# Patient Record
Sex: MALE | Race: WHITE | Employment: UNEMPLOYED | URBAN - METROPOLITAN AREA
[De-identification: names, ages, dates, MRNs, and addresses within clinical notes are randomized per-mention and may not be internally consistent; named-entity substitution may affect disease eponyms.]

---

## 2020-09-21 ENCOUNTER — OFFICE VISIT (OUTPATIENT)
Dept: ORTHOPEDIC SURGERY | Age: 45
End: 2020-09-21

## 2020-09-21 VITALS
SYSTOLIC BLOOD PRESSURE: 127 MMHG | WEIGHT: 142 LBS | DIASTOLIC BLOOD PRESSURE: 90 MMHG | TEMPERATURE: 97 F | HEIGHT: 68 IN | HEART RATE: 78 BPM | BODY MASS INDEX: 21.52 KG/M2

## 2020-09-21 PROCEDURE — 99203 OFFICE O/P NEW LOW 30 MIN: CPT | Performed by: ORTHOPAEDIC SURGERY

## 2020-09-21 RX ORDER — GABAPENTIN 300 MG/1
CAPSULE ORAL
Qty: 90 CAPSULE | Refills: 1 | Status: SHIPPED | OUTPATIENT
Start: 2020-09-21 | End: 2020-09-21 | Stop reason: CLARIF

## 2020-09-21 RX ORDER — GABAPENTIN 300 MG/1
CAPSULE ORAL
Qty: 90 CAPSULE | Refills: 1 | Status: SHIPPED | OUTPATIENT
Start: 2020-09-21 | End: 2022-06-15

## 2020-09-21 ASSESSMENT — ENCOUNTER SYMPTOMS
ABDOMINAL PAIN: 0
DIARRHEA: 0
SHORTNESS OF BREATH: 0
COLOR CHANGE: 0
APNEA: 0
VOMITING: 0
NAUSEA: 0
ABDOMINAL DISTENTION: 0
CONSTIPATION: 0
CHEST TIGHTNESS: 0
COUGH: 0

## 2020-09-21 NOTE — LETTER
[address] [place] [address] and Sports Medicine  [place] [address]  Phone: [phone]  Fax: DO Carlos      September 21, 2020     Patient: Vishal Cabello   YOB: 1975   Date of Visit: 9/21/2020       To Whom It May Concern: It is my medical opinion that Vishal Cabello may return to light duty immediately with the following restrictions: Right arm work while constantly wearing the sling on the left. If you have any questions or concerns, please don't hesitate to call.     Sincerely,        Lazarus Bramble, DO
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
## 2020-10-01 ENCOUNTER — TELEPHONE (OUTPATIENT)
Dept: ORTHOPEDIC SURGERY | Age: 45
End: 2020-10-01

## 2020-10-02 NOTE — TELEPHONE ENCOUNTER
I talked to the patient, I will obtain the records and talk with Dr. Ariadna Diane about what to do next and I will follow up with the patient.

## 2020-10-05 ENCOUNTER — TELEPHONE (OUTPATIENT)
Dept: ORTHOPEDIC SURGERY | Age: 45
End: 2020-10-05

## 2020-10-05 NOTE — TELEPHONE ENCOUNTER
STD Provider needs documentation. We have the faxed paper work from Jm to complete and return. Dr. JASSI GUSMAN [facility] has recommended a MRI of the brachial plexus with contrast.  With speaking with the MRI department she stated with and without contrast.  If you agree, please sign and return either Tamara Amezquita or myself to contact the patient for further instruction.

## 2020-10-06 ENCOUNTER — TELEPHONE (OUTPATIENT)
Dept: ORTHOPEDIC SURGERY | Age: 45
End: 2020-10-06

## 2020-10-06 NOTE — TELEPHONE ENCOUNTER
PT said that Radiology needs the MRI order signed by Dr Lizzy Robison before PT can come do it. PT desires that MRI order be signed please and reissued.

## 2020-10-23 ENCOUNTER — HOSPITAL ENCOUNTER (OUTPATIENT)
Dept: MRI IMAGING | Age: 45
Discharge: HOME | End: 2020-10-25

## 2020-10-23 PROCEDURE — A9579 GAD-BASE MR CONTRAST NOS,1ML: HCPCS | Performed by: ORTHOPAEDIC SURGERY

## 2020-10-23 PROCEDURE — 73220 MRI UPPR EXTREMITY W/O&W/DYE: CPT

## 2020-10-23 PROCEDURE — 6360000004 HC RX CONTRAST MEDICATION: Performed by: ORTHOPAEDIC SURGERY

## 2020-10-23 RX ADMIN — GADOTERIDOL 15 ML: 279.3 INJECTION, SOLUTION INTRAVENOUS at 08:52

## 2022-06-15 ENCOUNTER — OFFICE VISIT (OUTPATIENT)
Dept: PRIMARY CARE CLINIC | Age: 47
End: 2022-06-15
Payer: MEDICAID

## 2022-06-15 VITALS
HEART RATE: 75 BPM | OXYGEN SATURATION: 98 % | WEIGHT: 170 LBS | RESPIRATION RATE: 18 BRPM | BODY MASS INDEX: 25.76 KG/M2 | DIASTOLIC BLOOD PRESSURE: 76 MMHG | SYSTOLIC BLOOD PRESSURE: 124 MMHG | HEIGHT: 68 IN

## 2022-06-15 DIAGNOSIS — R53.82 CHRONIC FATIGUE: ICD-10-CM

## 2022-06-15 DIAGNOSIS — E78.9 LIPID DISORDER: ICD-10-CM

## 2022-06-15 DIAGNOSIS — Z11.4 ENCOUNTER FOR SCREENING FOR HIV: ICD-10-CM

## 2022-06-15 DIAGNOSIS — L50.9 HIVES: ICD-10-CM

## 2022-06-15 DIAGNOSIS — R73.09 IMPAIRED GLUCOSE METABOLISM: ICD-10-CM

## 2022-06-15 DIAGNOSIS — E55.9 VITAMIN D DEFICIENCY: ICD-10-CM

## 2022-06-15 DIAGNOSIS — R42 DIZZINESS: ICD-10-CM

## 2022-06-15 DIAGNOSIS — Z11.59 NEED FOR HEPATITIS C SCREENING TEST: ICD-10-CM

## 2022-06-15 DIAGNOSIS — M54.6 MIDLINE THORACIC BACK PAIN, UNSPECIFIED CHRONICITY: Primary | ICD-10-CM

## 2022-06-15 PROCEDURE — G8427 DOCREV CUR MEDS BY ELIG CLIN: HCPCS | Performed by: STUDENT IN AN ORGANIZED HEALTH CARE EDUCATION/TRAINING PROGRAM

## 2022-06-15 PROCEDURE — 1036F TOBACCO NON-USER: CPT | Performed by: STUDENT IN AN ORGANIZED HEALTH CARE EDUCATION/TRAINING PROGRAM

## 2022-06-15 PROCEDURE — G8419 CALC BMI OUT NRM PARAM NOF/U: HCPCS | Performed by: STUDENT IN AN ORGANIZED HEALTH CARE EDUCATION/TRAINING PROGRAM

## 2022-06-15 PROCEDURE — 99204 OFFICE O/P NEW MOD 45 MIN: CPT | Performed by: STUDENT IN AN ORGANIZED HEALTH CARE EDUCATION/TRAINING PROGRAM

## 2022-06-15 RX ORDER — BLOOD PRESSURE TEST KIT
1 KIT MISCELLANEOUS DAILY
Qty: 1 KIT | Refills: 0 | Status: SHIPPED | OUTPATIENT
Start: 2022-06-15

## 2022-06-15 SDOH — ECONOMIC STABILITY: FOOD INSECURITY: WITHIN THE PAST 12 MONTHS, YOU WORRIED THAT YOUR FOOD WOULD RUN OUT BEFORE YOU GOT MONEY TO BUY MORE.: NEVER TRUE

## 2022-06-15 SDOH — ECONOMIC STABILITY: FOOD INSECURITY: WITHIN THE PAST 12 MONTHS, THE FOOD YOU BOUGHT JUST DIDN'T LAST AND YOU DIDN'T HAVE MONEY TO GET MORE.: NEVER TRUE

## 2022-06-15 ASSESSMENT — ENCOUNTER SYMPTOMS
SHORTNESS OF BREATH: 0
WHEEZING: 0
BACK PAIN: 1
COUGH: 0
RHINORRHEA: 0
EYE ITCHING: 0
EYE DISCHARGE: 0
ABDOMINAL PAIN: 0
ABDOMINAL DISTENTION: 0

## 2022-06-15 ASSESSMENT — PATIENT HEALTH QUESTIONNAIRE - PHQ9
SUM OF ALL RESPONSES TO PHQ QUESTIONS 1-9: 0
SUM OF ALL RESPONSES TO PHQ QUESTIONS 1-9: 0
SUM OF ALL RESPONSES TO PHQ9 QUESTIONS 1 & 2: 0
SUM OF ALL RESPONSES TO PHQ QUESTIONS 1-9: 0
SUM OF ALL RESPONSES TO PHQ QUESTIONS 1-9: 0
1. LITTLE INTEREST OR PLEASURE IN DOING THINGS: 0
2. FEELING DOWN, DEPRESSED OR HOPELESS: 0

## 2022-06-15 ASSESSMENT — SOCIAL DETERMINANTS OF HEALTH (SDOH): HOW HARD IS IT FOR YOU TO PAY FOR THE VERY BASICS LIKE FOOD, HOUSING, MEDICAL CARE, AND HEATING?: NOT VERY HARD

## 2022-06-15 NOTE — PROGRESS NOTES
[facility] PRIMARY CARE  [facility] [address] [facility] [address]  [address] [address]  Dept: [phone]  Dept Fax: [phone]    Glenna Dial is a 42 y.o. male who presents today for his medical conditions/complaints as noted below. Chief Complaint   Patient presents with    New Patient    Referral - General     derm       HPI:     42 y. o. M presented to office to establish care. He had multiple questions and concerns. Mentioned he has upper thoracic back pain since he was a teenager. Initially was to follow-up with physical therapy and chiropractor but stopped due to insurance issues. Requested imaging of upper back. He also has previous history of brachial plexopathy. Used to be on Neurontin for this which he stopped on his own due to side effects. Stable. Mentioned he had couple of episodes of dizziness. He felt he was dehydrated. He feels dizzy while standing. Denied any associated chest pain, palpitations, shortness of breath or pedal edema. No previous cardiac history. He mentioned he gets headaches. Got his eyes checked recently and is waiting to be glasses. Blood pressure normal this visit. No other current complaints. Advised lifestyle changes. Medications reviewed and refilled as appropriate, problem list updated. All concerns discussed in details and all questions answered to patient satisfaction.             No results found for: LABA1C          ( goal A1C is < 7)   No results found for: LABMICR  No results found for: LDLCHOLESTEROL, LDLCALC    (goal LDL is <100)   No results found for: AST, ALT, BUN, CR  BP Readings from Last 3 Encounters:   06/15/22 124/76   20 (!) 127/90   12 118/72          (goal 120/80)    Past Medical History:   Diagnosis Date    Gout       Past Surgical History:   Procedure Laterality Date    SHOULDER SURGERY      VASECTOMY         Family History   Family history unknown: Yes       Social History Tobacco Use    Smoking status: Former Smoker     Packs/day: 1.00     Types: Cigarettes     Quit date: 6/15/2019     Years since quitting: 3.0    Smokeless tobacco: Never Used   Substance Use Topics    Alcohol use: No      Current Outpatient Medications   Medication Sig Dispense Refill    Blood Pressure KIT 1 kit by Does not apply route daily 1 kit 0     No current facility-administered medications for this visit. Allergies   Allergen Reactions    Ultram [Tramadol Hcl] Nausea Only     Dizzy and lightheaded       Health Maintenance   Topic Date Due    COVID-19 Vaccine (1) Never done    A1C test (Diabetic or Prediabetic)  Never done    HIV screen  Never done    Hepatitis C screen  Never done    Lipids  Never done    Flu vaccine (Season Ended) 09/01/2022    Depression Screen  06/15/2023    DTaP/Tdap/Td vaccine (2 - Td or Tdap) 07/18/2030    Hepatitis A vaccine  Aged Out    Hepatitis B vaccine  Aged Out    Hib vaccine  Aged Out    Meningococcal (ACWY) vaccine  Aged Out    Pneumococcal 0-64 years Vaccine  Aged Out       Subjective:      Review of Systems   Constitutional: Positive for fatigue. Negative for chills and fever. HENT: Negative for congestion, hearing loss and rhinorrhea. Eyes: Negative for discharge and itching. Respiratory: Negative for cough, shortness of breath and wheezing. Cardiovascular: Negative for chest pain, palpitations and leg swelling. Gastrointestinal: Negative for abdominal distention and abdominal pain. Endocrine: Negative for polydipsia and polyphagia. Genitourinary: Negative for difficulty urinating and dysuria. Musculoskeletal: Positive for arthralgias and back pain. Upper thoracic back pain   Skin: Negative for rash and wound. Neurological: Positive for headaches. Negative for dizziness, seizures and light-headedness. Psychiatric/Behavioral: Negative for agitation and behavioral problems.        Objective:     Physical Exam  Constitutional:       Appearance: He is well-developed. HENT:      Head: Normocephalic and atraumatic. Eyes:      Conjunctiva/sclera: Conjunctivae normal.      Pupils: Pupils are equal, round, and reactive to light. Neck:      Thyroid: No thyromegaly. Vascular: No JVD. Cardiovascular:      Rate and Rhythm: Normal rate and regular rhythm. Heart sounds: Normal heart sounds. No murmur heard. Pulmonary:      Effort: Pulmonary effort is normal.      Breath sounds: Normal breath sounds. No wheezing. Abdominal:      General: Bowel sounds are normal. There is no distension. Palpations: Abdomen is soft. Tenderness: There is no abdominal tenderness. There is no rebound. Musculoskeletal:         General: No tenderness. Normal range of motion. Cervical back: Normal range of motion and neck supple. Neurological:      Mental Status: He is alert and oriented to person, place, and time. Cranial Nerves: No cranial nerve deficit. Psychiatric:         Behavior: Behavior normal.       /76   Pulse 75   Resp 18   Ht 5' 8\" (1.727 m)   Wt 170 lb (77.1 kg)   SpO2 98%   BMI 25.85 kg/m²     Assessment:        Diagnoses and all orders for this visit:  Midline thoracic back pain, unspecified chronicity  -     XR THORACIC SPINE (2 VIEWS); Future  -     [facility] Physical Therapy - [place]  Chronic fatigue  -     CBC with Auto Differential; Future  -     Comprehensive Metabolic Panel; Future  -     Vitamin B12 & Folate; Future  -     TSH with Reflex; Future  Lipid disorder  -     Lipid Panel; Future  Vitamin D deficiency  -     Vitamin D 25 Hydroxy; Future  Impaired glucose metabolism  -     Hemoglobin A1C; Future  Need for hepatitis C screening test  -     Hepatitis C Antibody  Encounter for screening for HIV  -     HIV Screen;  Future  Dizziness  -     Blood Pressure KIT; 1 kit by Does not apply route daily  -     Compression Helga Kailash Rojo MD, Dermatology, [facility]      She had multiple questions and concerns. Extra time was spent to explain and listen to all the concerns. Answered to patient's satisfaction. Plan:      Return in about 6 months (around 12/15/2022).     Orders Placed This Encounter   Procedures    XR THORACIC SPINE (2 VIEWS)     Standing Status:   Future     Standing Expiration Date:   6/15/2023     Order Specific Question:   Reason for exam:     Answer:   upper thoracic pain    CBC with Auto Differential     Standing Status:   Future     Standing Expiration Date:   6/15/2023    Comprehensive Metabolic Panel     Standing Status:   Future     Standing Expiration Date:   12/15/2023    Hemoglobin A1C     Standing Status:   Future     Standing Expiration Date:   12/15/2023    Lipid Panel     Standing Status:   Future     Standing Expiration Date:   6/15/2023     Order Specific Question:   Is Patient Fasting?/# of Hours     Answer:   8-10 hrs    Vitamin B12 & Folate     Standing Status:   Future     Standing Expiration Date:   6/15/2023    TSH with Reflex     Standing Status:   Future     Standing Expiration Date:   6/15/2023    Vitamin D 25 Hydroxy     Standing Status:   Future     Standing Expiration Date:   12/15/2023    Hepatitis C Antibody    HIV Screen     Standing Status:   Future     Standing Expiration Date:   6/15/2023   Taylor Thomson MD, Dermatology, [facility]     Referral Priority:   Routine     Referral Type:   Eval and Treat     Referral Reason:   Specialty Services Required     Referred to Provider:   Cam Parks MD     Requested Specialty:   Dermatology     Number of Visits Requested:   [facility] Physical Therapy - [place]     Referral Priority:   Routine     Referral Type:   Eval and Treat     Referral Reason:   Specialty Services Required     Requested Specialty:   Physical Therapist     Number of Visits Requested:   1    Compression Stocking     Orders Placed This Encounter   Medications    Blood Pressure KIT     Si kit by Does not apply route daily     Dispense:  1 kit     Refill:  0         Patient given educational materials - see patient instructions. Discussed use, benefit, and side effects of prescribedmedications. All patient questions answered. Pt voiced understanding. Reviewed health maintenance. Instructed to continue current medications, diet and exercise. Patient agreed with treatment plan. Follow up as directed. I spent a total of 45 minutes face to face with this patient. Over 50% of that time was spent on counseling and care coordination. Please see assessment and plan for details. Electronically signed by   Boom Wan MD on 6/15/2022 at 10:16 AM  [facility]. Please note that this chart was generated using voice recognition Dragon dictation software. Although every effort was made to ensure the accuracy of this automatedtranscription, some errors in transcription may have occurred.

## 2022-06-24 ENCOUNTER — HOSPITAL ENCOUNTER (OUTPATIENT)
Dept: PHYSICAL THERAPY | Facility: CLINIC | Age: 47
Setting detail: THERAPIES SERIES
Discharge: HOME | End: 2022-06-24
Payer: MEDICAID

## 2022-06-24 PROCEDURE — 97162 PT EVAL MOD COMPLEX 30 MIN: CPT
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
## 2022-07-13 ENCOUNTER — HOSPITAL ENCOUNTER (OUTPATIENT)
Dept: PHYSICAL THERAPY | Facility: CLINIC | Age: 47
Setting detail: THERAPIES SERIES
Discharge: HOME | End: 2022-07-13
Payer: MEDICAID

## 2022-07-13 PROCEDURE — 97110 THERAPEUTIC EXERCISES: CPT

## 2022-07-13 PROCEDURE — 97016 VASOPNEUMATIC DEVICE THERAPY: CPT

## 2022-07-13 NOTE — FLOWSHEET NOTE
[] [address]  [address]  [phone]  F: [phone] [] [address]  [facility]   [address]  P: [phone]  F: [phone] [x] [place] &  Therapy  [address]  P: [phone]  F: [phone] [] [address]  P: [phone]  F: [phone] [] [address]  [facility]   [address]   [place]: [phone]  F: [phone]      Physical Therapy Daily Treatment Note    Date:  2022  Patient Name:  Jason Lay    :  1975  MRN: [id number]  Physician: Dr. Lai Cea: [facility]- [facility] yr; [place] 75; No patient liability  Medical Diagnosis: Midline thoracic spine pain                   Rehab Codes: M54.6  Onset Date: 20                            Next 's appt.: 22  Visit# / total visits:    Cancels/No Shows: 0/0    Subjective:    Pain:  [x] Yes  [] No Location: L shoulder and mid T spine  Pain Rating: (0-10 scale) /10  Pain altered Tx:  [x] No  [] Yes  Action:  Comments: he will riding a jetski for 1.5 hours in 2 wks. Advised him to wear a kathleen brace. He can do a push up on the floor even though it's not 50/50.       Objective:  Modalities:   Treatment Location  Left      Right                          Position   Shoulder [x]          []  [] Supine    [] Prone   [] Side lying  [x] Sitting          Treatment Modality   2 Vasocompression    34° temp    Med pressure     15min   1 Other:  ex       Precautions: standard  Exercises:  Exercise Reps/ Time Weight/ Level Completed Comments   Supine           Hold swiss ball w/ external perturbations 2x30\"   x     ABCs w/ ball 1x 1.5 lbs x     Sidelying           Ext rot 2x10 1 lb x W/ towel; [email or web address]  Date: 07/13/2022  Prepared by: Shane Brito    Exercises  Sidelying Shoulder External Rotation - 2 x daily - 7 x weekly - 2 sets - 10 reps  Sidelying Shoulder Abduction Palm Forward - 2 x daily - 7 x weekly - 2 sets - 10 reps  Prone Shoulder Extension - Single Arm - 2 x daily - 7 x weekly - 2 sets - 10 reps  Prone Single Arm Shoulder Horizontal Abduction with Dumbbell - 2 x daily - 7 x weekly - 2 sets - 10 reps  Prone Shoulder Flexion with Dumbbell - 2 x daily - 7 x weekly - 2 sets - 10 reps  Prone Shoulder External Rotation - 2 x daily - 7 x weekly - 2 sets - 10 reps  Supine Shoulder Alphabet - 2 x daily - 7 x weekly - 2 sets - 1 reps  Shoulder External Rotation with Anchored Resistance - 2 x daily - 7 x weekly - 2 sets - 10 reps  Standing Shoulder Internal Rotation with Anchored Resistance - 2 x daily - 7 x weekly - 2 sets - 10 reps  Push Up on Table - 2 x daily - 7 x weekly - 2 sets - 10 reps      Comprehension of Education:  [] Verbalizes understanding. [] Demonstrates understanding. [x] Needs review. [] Demonstrates/verbalizes HEP/Ed previously given. Plan: [x] Continue current frequency toward long and short term goals.     [] Specific Instructions for subsequent treatments:       Time In:1500            Time Out: 9202    Electronically signed by:  Shane Brito, PT

## 2022-07-14 ENCOUNTER — OFFICE VISIT (OUTPATIENT)
Dept: DERMATOLOGY | Age: 47
End: 2022-07-14
Payer: MEDICAID

## 2022-07-14 VITALS
HEIGHT: 68 IN | OXYGEN SATURATION: 97 % | WEIGHT: 172 LBS | BODY MASS INDEX: 26.07 KG/M2 | TEMPERATURE: 97.2 F | HEART RATE: 73 BPM | SYSTOLIC BLOOD PRESSURE: 131 MMHG | DIASTOLIC BLOOD PRESSURE: 84 MMHG

## 2022-07-14 DIAGNOSIS — L73.9 FOLLICULITIS: ICD-10-CM

## 2022-07-14 DIAGNOSIS — L28.1 PRURIGO NODULARIS: ICD-10-CM

## 2022-07-14 DIAGNOSIS — L50.9 URTICARIA: Primary | ICD-10-CM

## 2022-07-14 PROCEDURE — 1036F TOBACCO NON-USER: CPT | Performed by: PHYSICIAN ASSISTANT

## 2022-07-14 PROCEDURE — 99204 OFFICE O/P NEW MOD 45 MIN: CPT | Performed by: PHYSICIAN ASSISTANT

## 2022-07-14 PROCEDURE — G8419 CALC BMI OUT NRM PARAM NOF/U: HCPCS | Performed by: PHYSICIAN ASSISTANT

## 2022-07-14 PROCEDURE — G8427 DOCREV CUR MEDS BY ELIG CLIN: HCPCS | Performed by: PHYSICIAN ASSISTANT

## 2022-07-14 RX ORDER — CLINDAMYCIN PHOSPHATE 11.9 MG/ML
SOLUTION TOPICAL
Qty: 60 ML | Refills: 2 | Status: SHIPPED | OUTPATIENT
Start: 2022-07-14 | End: 2022-08-13

## 2022-07-14 RX ORDER — FEXOFENADINE HCL 180 MG/1
TABLET ORAL
Qty: 60 TABLET | Refills: 1 | Status: SHIPPED | OUTPATIENT
Start: 2022-07-14

## 2022-07-14 NOTE — PROGRESS NOTES
Dermatology Patient Note  [facility] [address]  [place] [address]  Dept: [phone]  Dept Fax: [phone]      VISITDATE: 7/14/2022   REFERRING PROVIDER: Cassandra Ramos MD      Suzy Rm is a 42 y.o. male  who presents today in the office for:    Allergic Reaction (grass allergy, but is frequetly getting hives from other unknown causes even when not exposed to grass or plant. s.) and Mole (two moles on right hand and arm. pt staes the mole on his hand has gotten larger )      HISTORY OF PRESENT ILLNESS:  As above. MEDICAL PROBLEMS:  Patient Active Problem List    Diagnosis Date Noted    Chronic fatigue 06/15/2022     Priority: Medium    Lipid disorder 06/15/2022     Priority: Medium    Vitamin D deficiency 06/15/2022     Priority: Medium    Impaired glucose metabolism 06/15/2022     Priority: Medium    Need for hepatitis C screening test 06/15/2022     Priority: Medium    Encounter for screening for HIV 06/15/2022     Priority: Medium    Dizziness 06/15/2022     Priority: Medium    Midline thoracic back pain 06/15/2022     Priority: Medium    Gout        CURRENT MEDICATIONS:   Current Outpatient Medications   Medication Sig Dispense Refill    benzoyl peroxide 5 % external liquid Wash affected areas once daily 227 g 3    clindamycin (CLEOCIN-T) 1 % external solution Apply topically, daily, to posterior scalp. 60 mL 2    fexofenadine (ALLEGRA) 180 MG tablet Take 1-2 tablets daily, prn itching/allergies. 60 tablet 1    Blood Pressure KIT 1 kit by Does not apply route daily 1 kit 0     No current facility-administered medications for this visit. ALLERGIES:   Allergies   Allergen Reactions    Mixed Grasses Hives, Itching, Swelling and Rash     Unspecified type, req.  By patient     Tramadol      Other reaction(s): GI Upset, lightheaded, chills  Passed out      Ultram [Tramadol Hcl] Nausea Only Dizzy and lightheaded       SOCIAL HISTORY:  Social History     Tobacco Use    Smoking status: Former Smoker     Packs/day: 1.00     Types: Cigarettes     Quit date: 6/15/2019     Years since quitting: 3.0    Smokeless tobacco: Never Used   Substance Use Topics    Alcohol use: No       Pertinent ROS:  Review of Systems  Skin: Denies any new changing, growing or bleeding lesions or rashes except as described in the HPI   Constitutional: Denies fevers, chills, and malaise. PHYSICAL EXAM:   /84   Pulse 73   Temp 97.2 °F (36.2 °C) (Temporal)   Ht 5' 8\" (1.727 m)   Wt 172 lb (78 kg)   SpO2 97%   BMI 26.15 kg/m²     The patient is generally well appearing, well nourished, alert and conversational. Affect is normal.    Cutaneous Exam:  Physical Exam  Sun-exposed skin: head/face, neck, both arms, digits and nails were examined. Facial covering was not removed during examination. Diagnoses/exam findings/medical history pertinent to this visit are listed below:    Assessment:   Diagnosis Orders   1. Urticaria  Marilu Hallman MD, Allergy & Immunology, [place]    MARCELA Screen with Reflex    Electrophoresis Protein, Serum    Ferritin    Iron and TIBC    Hepatitis Panel, Acute    fexofenadine (ALLEGRA) 180 MG tablet   2. Folliculitis  benzoyl peroxide 5 % external liquid    clindamycin (CLEOCIN-T) 1 % external solution   3. Prurigo nodularis          Plan:  1. Urticaria  - chronic illness with progression and/or exacerbation  - Marilu Hallman MD, Allergy & Immunology, [place]  - MARCELA Screen with Reflex; Future  - Electrophoresis Protein, Serum; Future  - Ferritin; Future  - Iron and TIBC; Future  - Hepatitis Panel, Acute; Future  - fexofenadine (ALLEGRA) 180 MG tablet; Take 1-2 tablets daily, prn itching/allergies. Dispense: 60 tablet; Refill: 1    2. Folliculitis  - chronic illness with progression and/or exacerbation  - benzoyl peroxide 5 % external liquid;  Wash affected areas once daily  Dispense: 227 g; Refill: 3  - clindamycin (CLEOCIN-T) 1 % external solution; Apply topically, daily, to posterior scalp. Dispense: 60 mL; Refill: 2    3. Prurigo nodularis  - D/C excoriation      RTC 3M    Future Appointments   Date Time Provider Indira Poonam   7/20/2022  1:00 PM [address] [place] [address],  [facility]   8/3/2022  2:00 PM Amandeep Wesley,  [facility]   10/19/2022  1:30 PM Bryanna Pérez PA-C  derm [facility]   12/16/2022  9:30 AM [facility], [place]  [facility]         There are no Patient Instructions on file for this visit.       Electronically signed by Bryanna Pérez PA-C on 7/14/22 at 3:39 PM EDT

## 2022-07-15 PROBLEM — Z11.4 ENCOUNTER FOR SCREENING FOR HIV: Status: RESOLVED | Noted: 2022-06-15 | Resolved: 2022-07-15

## 2022-07-15 PROBLEM — Z11.59 NEED FOR HEPATITIS C SCREENING TEST: Status: RESOLVED | Noted: 2022-06-15 | Resolved: 2022-07-15

## 2022-07-20 ENCOUNTER — HOSPITAL ENCOUNTER (OUTPATIENT)
Dept: PHYSICAL THERAPY | Facility: CLINIC | Age: 47
Setting detail: THERAPIES SERIES
Discharge: HOME | End: 2022-07-20
Payer: MEDICAID

## 2022-07-20 NOTE — FLOWSHEET NOTE
[] [facility]) [facility] &  Therapy  [address]    [phone]  F: [phone]   [] [address] [address]   [address]  P: [phone]  F: [phone]  [] [address] &  Therapy  [address]  P: [phone]  F: [phone] [] [address]  P: [phone]  F: [phone]  [] [address]  [address] [facility] [address]   [address]   [place]: [phone]  F: [phone]   [] [place] [address] [facility] [address]  [place]: [phone]   F: [phone]     Physical Therapy Cancel/No Show note    Date: 2022  Patient: Krish Wake  : 1975  MRN: [id number]    Cancels/No Shows to date:     For today's appointment patient:    [x]  Cancelled    [] Rescheduled appointment    [] No-show     Reason given by patient:    []  Patient ill    []  Conflicting appointment    [] No transportation      [] Conflict with work    [] No reason given    [] Weather related    [] [id number]    [] Other:      Comments:       [] Next appointment was confirmed    Electronically signed by: Delbert Woodson

## 2022-08-03 ENCOUNTER — HOSPITAL ENCOUNTER (OUTPATIENT)
Dept: PHYSICAL THERAPY | Facility: CLINIC | Age: 47
Setting detail: THERAPIES SERIES
Discharge: HOME | End: 2022-08-03
Payer: MEDICAID

## 2022-08-03 PROCEDURE — 97016 VASOPNEUMATIC DEVICE THERAPY: CPT

## 2022-08-03 PROCEDURE — 97110 THERAPEUTIC EXERCISES: CPT

## 2022-08-03 NOTE — FLOWSHEET NOTE
[x] [facility]  Outpatient Rehabilitation &  Therapy  [address] [facility]  P: [phone]  F: [phone]     Physical Therapy Daily Treatment Note    Date:  8/3/2022  Patient Name:  Love Moy     :  1975  MRN: [id number]  Physician: Dr. Padgett Stack: [address] [facility] yr; [address]; No patient liability  Medical Diagnosis: Midline thoracic spine pain    Rehab Codes: M54.6  Onset Date: 20                             Next 's appt.: 22  Visit# / total visits: 3/16     Cancels/No Shows: 1/0    Subjective:    Pain:  [x] Yes  [] No Location: L shoulder and mid T spine  Pain Rating: (0-10 scale) 0/10  Pain altered Tx:  [x] No  [] Yes  Action:  Comments:  reports that he is getting a little over 1 set of his HEP, but is not able to get 2 full sets in per day. He went jet skiing and had no issues.       Objective:  Modalities:   Treatment Location    Left      Right                          Position   Shoulder [x]          []  [] Supine    [] Prone   [] Side lying  [x] Sitting          Treatment Modality   2 Vasocompression    34° temp    Med pressure     15min   1 Other:  ex       Precautions: standard  Exercises:  Exercise Reps/ Time Weight/ Level Completed Comments   Sidelying           Ext rot 15 2 lbs X W/ towel; alt with abd   Abd 15 2 lbs X Alt w/ ER   Prone        perform prone ex as a circuit   Shoulder ext 15 2 lb X  supinated forearm   Shoulder hor abd 15 2 lb X  neutral forearm   Shoulder scaption 15 2 lb X     Shoulder ER at 90/90 15 2 lb X     Quadruped knee rocks 15   DC  front/back and side/side   Gym           TB IR/ER  2x10 lime X W/ towel, issued lime band   ABCs on wall 3 ways 1x ea 3.5 lbs X 90 and 120 deg of shoulder flexion  90 of shoulder abd   Tall table push ups  2x10   X     Bodyblade  10x   X Flexion and abd   Push up position w/ DKTC 12 blue X    External perturbations with ball at 90 deg flexion 1x blue X    TB supination *         Other:     Specific Instructions for next treatment:  1. Add above ex. 2.  Finish with Game ready     Treatment Charges: Mins Units   []  Modalities     [x]  Ther Exercise 44 3   []  Manual Therapy     []  Ther Activities     []  Aquatics     [x]  Vasocompression 15 1   []  Other     Total Treatment time 59 4       Assessment: [x] Progressing toward goals. Was able to increase resistance from 1 to 2 lbs with his ex. After his prone ex at only 15 reps, he was demonstrating increased diaphoresis. Issued a lime TB to advance his HEP     [] No change. [] Other:  [] Patient would continue to benefit from skilled physical therapy services in order to:    STG/LTG  STG: (to be met in 8 treatments)  ? Pain:<5/10 at the worst  ? ROM: with L shoulder abd to >150 deg   ? Strength: at least 4+/5 with all shoulder movemnts to improve L UE function  ? Function:UEFS >50/80  Patient to be independent with home exercise program as demonstrated by performance with correct form without cues. Demonstrate Knowledge of fall prevention  LTG: (to be met in 16 treatments)  UEFS score >60/80  Pain <3/10 at the worst with movement  Maximize L UE isometric strength values with hand held dynomometer           Patient goals:'regain more function\"  Pt. Education:  [x] Yes  [] No  [x] Reviewed Prior HEP/Ed  Method of Education: [] Verbal  [] Demo  [x] Written  Access Code: [email or web address]  URL: [email or web address]  Date: 07/13/2022  Prepared by: Amandeep Wesley    Exercises  Sidelying Shoulder External Rotation - 2 x daily - 7 x weekly - 2 sets - 10 reps  Sidelying Shoulder Abduction Palm Forward - 2 x daily - 7 x weekly - 2 sets - 10 reps  Prone Shoulder Extension - Single Arm - 2 x daily - 7 x weekly - 2 sets - 10 reps  Prone Single Arm Shoulder Horizontal Abduction with Dumbbell - 2 x daily - 7 x weekly - 2 sets - 10 reps  Prone Shoulder Flexion with Dumbbell - 2 x daily - 7 x weekly - 2 sets - 10 reps  Prone Shoulder External Rotation - 2 x daily - 7 x weekly - 2 sets - 10 reps  Supine Shoulder Alphabet - 2 x daily - 7 x weekly - 2 sets - 1 reps  Shoulder External Rotation with Anchored Resistance - 2 x daily - 7 x weekly - 2 sets - 10 reps  Standing Shoulder Internal Rotation with Anchored Resistance - 2 x daily - 7 x weekly - 2 sets - 10 reps  Push Up on Table - 2 x daily - 7 x weekly - 2 sets - 10 reps      Comprehension of Education:  [] Verbalizes understanding. [] Demonstrates understanding. [x] Needs review. [] Demonstrates/verbalizes HEP/Ed previously given. Plan: [x] Continue current frequency toward long and short term goals.     [] Specific Instructions for subsequent treatments:       Time In:1400            Time Out: 5449    Electronically signed by:  Nabil Saxena PT

## 2022-08-10 ENCOUNTER — HOSPITAL ENCOUNTER (OUTPATIENT)
Dept: PHYSICAL THERAPY | Facility: CLINIC | Age: 47
Setting detail: THERAPIES SERIES
Discharge: HOME | End: 2022-08-10
Payer: MEDICAID

## 2022-08-10 PROCEDURE — 97110 THERAPEUTIC EXERCISES: CPT

## 2022-08-10 NOTE — FLOWSHEET NOTE
[x] [facility]  Outpatient Rehabilitation &  Therapy  [address] [facility]  P: [phone]  F: [phone]     Physical Therapy Daily Treatment Note    Date:  8/10/2022  Patient Name:  Tasneem Parker     :  1975  MRN: [id number]  Physician: Dr. Medina Jodee: [address] [facility] yr; [facility] No patient liability  Medical Diagnosis: Midline thoracic spine pain    Rehab Codes: M54.6  Onset Date: 20                             Next 's appt.: 22  Visit# / total visits:      Cancels/No Shows: 1/0    Subjective:    Pain:  [x] Yes  [] No Location: L shoulder and mid T spine  Pain Rating: (0-10 scale) 0/10  Pain altered Tx:  [x] No  [] Yes  Action:  Comments:  Pt mentioned that he was extremely sore for over 4 days after last session. Mentioned that his fast twitch mm are no good and feels that maybe the bodyblade was too much. Pt also stated that he wishes to space out appointments with 2 week recovery periods.      Objective:  Modalities:   Treatment Location    Left      Right                          Position   Shoulder [x]          []  [] Supine    [] Prone   [] Side lying  [x] Sitting          Treatment Modality   PRN Vasocompression    34° temp    Med pressure     15min   1 Other:  ex       Precautions: standard  Exercises:  Exercise Reps/ Time Weight/ Level Completed Comments   UBE 10m  x    Sidelying           Ext rot 20 2 lbs x W/ towel; alt with abd   Abd 20 2 lbs x Alt w/ ER   Prone        perform prone ex as a circuit   Shoulder ext 15 2 lb x  supinated forearm   Shoulder hor abd 15 2 lb x  neutral forearm   Shoulder scaption 15 2 lb x     Shoulder ER at 90/90 15 2 lb x             Gym           TB IR/ER  2x10 lime x W/ towel, issued lime band   ABCs on wall 3 ways 1x ea 3.5 lbs x 90 and 120 deg of shoulder flexion  90 and 120 of shoulder abd   Tall table push ups  2x10   x     Bodyblade  10x    Flexion and abd   Push up position w/ DKTC 12 blue     External perturbations with ball at 90 deg flexion 1x blue     TB supination *         Other:     Specific Instructions for next treatment:  1. Add above ex. 2.  Finish with Game ready     Treatment Charges: Mins Units   []  Modalities     [x]  Ther Exercise 44 3   []  Manual Therapy     []  Ther Activities     []  Aquatics     []  Vasocompression      []  Other     Total Treatment time 44 3       Assessment: [x] Progressing toward goals. Added in UBE and corner stretch to help loosen up and improve movement of UE. Continued working on scapular mm strengthening to aide in support surround structures. Held a few exercises as a trial for recovery time. Pt did not wish to ice at the end of session today. [] No change. [] Other:  [] Patient would continue to benefit from skilled physical therapy services in order to:      STG: (to be met in 8 treatments)  ? Pain:<5/10 at the worst  ? ROM: with L shoulder abd to >150 deg   ? Strength: at least 4+/5 with all shoulder movemnts to improve L UE function  ? Function:UEFS >50/80  Patient to be independent with home exercise program as demonstrated by performance with correct form without cues. Demonstrate Knowledge of fall prevention  LTG: (to be met in 16 treatments)  UEFS score >60/80  Pain <3/10 at the worst with movement  Maximize L UE isometric strength values with hand held dynomometer           Patient goals:'regain more function\"  Pt. Education:  [x] Yes  [] No  [x] Reviewed Prior HEP/Ed  Method of Education: [] Verbal  [] Demo  [x] Written  Access Code: [email or web address]  URL: [employer]/  Date: 07/13/2022  Prepared by: Abraham Barlow    Exercises  Sidelying Shoulder External Rotation - 2 x daily - 7 x weekly - 2 sets - 10 reps  Sidelying Shoulder Abduction Palm Forward - 2 x daily - 7 x weekly - 2 sets - 10 reps  Prone Shoulder Extension - Single Arm - 2 x daily - 7 x weekly - 2 sets - 10 reps  Prone Single Arm Shoulder Horizontal Abduction with Dumbbell - 2 x daily - 7 x weekly - 2 sets - 10 reps  Prone Shoulder Flexion with Dumbbell - 2 x daily - 7 x weekly - 2 sets - 10 reps  Prone Shoulder External Rotation - 2 x daily - 7 x weekly - 2 sets - 10 reps  Supine Shoulder Alphabet - 2 x daily - 7 x weekly - 2 sets - 1 reps  Shoulder External Rotation with Anchored Resistance - 2 x daily - 7 x weekly - 2 sets - 10 reps  Standing Shoulder Internal Rotation with Anchored Resistance - 2 x daily - 7 x weekly - 2 sets - 10 reps  Push Up on Table - 2 x daily - 7 x weekly - 2 sets - 10 reps      Comprehension of Education:  [] Verbalizes understanding. [] Demonstrates understanding. [x] Needs review. [] Demonstrates/verbalizes HEP/Ed previously given. Plan: [x] Continue current frequency toward long and short term goals.     [] Specific Instructions for subsequent treatments:       Time In: 11:00 am            Time Out: 11:49 am    Electronically signed by:  Agustin Petit PTA

## 2022-08-11 ENCOUNTER — HOSPITAL ENCOUNTER (OUTPATIENT)
Age: 47
Discharge: HOME | End: 2022-08-13
Payer: MEDICAID

## 2022-08-11 ENCOUNTER — HOSPITAL ENCOUNTER (OUTPATIENT)
Age: 47
Setting detail: SPECIMEN
Discharge: HOME | End: 2022-08-11

## 2022-08-11 ENCOUNTER — HOSPITAL ENCOUNTER (OUTPATIENT)
Dept: GENERAL RADIOLOGY | Age: 47
Discharge: HOME | End: 2022-08-13
Payer: MEDICAID

## 2022-08-11 DIAGNOSIS — Z11.4 ENCOUNTER FOR SCREENING FOR HIV: ICD-10-CM

## 2022-08-11 DIAGNOSIS — E55.9 VITAMIN D DEFICIENCY: ICD-10-CM

## 2022-08-11 DIAGNOSIS — E78.9 LIPID DISORDER: ICD-10-CM

## 2022-08-11 DIAGNOSIS — R53.82 CHRONIC FATIGUE: ICD-10-CM

## 2022-08-11 DIAGNOSIS — M54.6 MIDLINE THORACIC BACK PAIN, UNSPECIFIED CHRONICITY: ICD-10-CM

## 2022-08-11 DIAGNOSIS — R73.09 IMPAIRED GLUCOSE METABOLISM: ICD-10-CM

## 2022-08-11 LAB
ABSOLUTE EOS #: 0.09 K/UL (ref 0–0.44)
ABSOLUTE IMMATURE GRANULOCYTE: 0.03 K/UL (ref 0–0.3)
ABSOLUTE LYMPH #: 2.37 K/UL (ref 1.1–3.7)
ABSOLUTE MONO #: 0.53 K/UL (ref 0.1–1.2)
ALBUMIN SERPL-MCNC: 4.9 G/DL (ref 3.5–5.2)
ALBUMIN/GLOBULIN RATIO: 2.1 (ref 1–2.5)
ALP BLD-CCNC: 62 U/L (ref 40–129)
ALT SERPL-CCNC: 21 U/L (ref 5–41)
ANION GAP SERPL CALCULATED.3IONS-SCNC: 12 MMOL/L (ref 9–17)
AST SERPL-CCNC: 19 U/L
BASOPHILS # BLD: 1 % (ref 0–2)
BASOPHILS ABSOLUTE: 0.06 K/UL (ref 0–0.2)
BILIRUB SERPL-MCNC: 0.33 MG/DL (ref 0.3–1.2)
BUN BLDV-MCNC: 18 MG/DL (ref 6–20)
CALCIUM SERPL-MCNC: 9.4 MG/DL (ref 8.6–10.4)
CHLORIDE BLD-SCNC: 100 MMOL/L (ref 98–107)
CHOLESTEROL/HDL RATIO: 5.8
CHOLESTEROL: 278 MG/DL
CO2: 25 MMOL/L (ref 20–31)
CREAT SERPL-MCNC: 1.04 MG/DL (ref 0.7–1.2)
EOSINOPHILS RELATIVE PERCENT: 1 % (ref 1–4)
FOLATE: 11.6 NG/ML
GFR AFRICAN AMERICAN: >60 ML/MIN
GFR NON-AFRICAN AMERICAN: >60 ML/MIN
GFR SERPL CREATININE-BSD FRML MDRD: NORMAL ML/MIN/{1.73_M2}
GLUCOSE BLD-MCNC: 89 MG/DL (ref 70–99)
HCT VFR BLD CALC: 47.6 % (ref 40.7–50.3)
HDLC SERPL-MCNC: 48 MG/DL
HEMOGLOBIN: 15 G/DL (ref 13–17)
HIV AG/AB: NONREACTIVE
IMMATURE GRANULOCYTES: 1 %
LDL CHOLESTEROL: 202 MG/DL (ref 0–130)
LYMPHOCYTES # BLD: 37 % (ref 24–43)
MCH RBC QN AUTO: 29.4 PG (ref 25.2–33.5)
MCHC RBC AUTO-ENTMCNC: 31.5 G/DL (ref 28.4–34.8)
MCV RBC AUTO: 93.2 FL (ref 82.6–102.9)
MONOCYTES # BLD: 8 % (ref 3–12)
NRBC AUTOMATED: 0 PER 100 WBC
PDW BLD-RTO: 13.8 % (ref 11.8–14.4)
PLATELET # BLD: 309 K/UL (ref 138–453)
PMV BLD AUTO: 10.8 FL (ref 8.1–13.5)
POTASSIUM SERPL-SCNC: 4.8 MMOL/L (ref 3.7–5.3)
RBC # BLD: 5.11 M/UL (ref 4.21–5.77)
SEG NEUTROPHILS: 52 % (ref 36–65)
SEGMENTED NEUTROPHILS ABSOLUTE COUNT: 3.27 K/UL (ref 1.5–8.1)
SODIUM BLD-SCNC: 137 MMOL/L (ref 135–144)
TOTAL PROTEIN: 7.2 G/DL (ref 6.4–8.3)
TRIGL SERPL-MCNC: 138 MG/DL
TSH SERPL DL<=0.05 MIU/L-ACNC: 2.22 UIU/ML (ref 0.3–5)
VITAMIN B-12: 604 PG/ML (ref 232–1245)
VITAMIN D 25-HYDROXY: 23.5 NG/ML
WBC # BLD: 6.4 K/UL (ref 3.5–11.3)

## 2022-08-11 PROCEDURE — 72070 X-RAY EXAM THORAC SPINE 2VWS: CPT

## 2022-08-12 DIAGNOSIS — E78.2 MIXED HYPERLIPIDEMIA: ICD-10-CM

## 2022-08-12 DIAGNOSIS — E55.9 VITAMIN D DEFICIENCY: Primary | ICD-10-CM

## 2022-08-12 LAB
ESTIMATED AVERAGE GLUCOSE: 108 MG/DL
HBA1C MFR BLD: 5.4 % (ref 4–6)

## 2022-08-12 RX ORDER — ATORVASTATIN CALCIUM 40 MG/1
40 TABLET, FILM COATED ORAL DAILY
Qty: 30 TABLET | Refills: 3 | Status: SHIPPED | OUTPATIENT
Start: 2022-08-12

## 2022-08-12 RX ORDER — ERGOCALCIFEROL 1.25 MG/1
50000 CAPSULE ORAL WEEKLY
Qty: 12 CAPSULE | Refills: 1 | Status: SHIPPED | OUTPATIENT
Start: 2022-08-12

## 2022-08-12 NOTE — RESULT ENCOUNTER NOTE
Labs show low vitamin D level for which I sent once week vitamin D supplements. Lipid profile shows high cholesterol and LDL. Will start patient on statin. Advise lifestyle changes as well.

## 2022-08-15 NOTE — RESULT ENCOUNTER NOTE
X-ray thoracic spine negative for acute fracture.   Few tiny osteophytes seen in mid thoracic spine otherwise grossly unremarkable thoracic spine x-ray

## 2022-08-24 ENCOUNTER — HOSPITAL ENCOUNTER (OUTPATIENT)
Dept: PHYSICAL THERAPY | Facility: CLINIC | Age: 47
Setting detail: THERAPIES SERIES
Discharge: HOME | End: 2022-08-24
Payer: MEDICAID

## 2022-08-24 PROCEDURE — 97110 THERAPEUTIC EXERCISES: CPT

## 2022-08-24 NOTE — FLOWSHEET NOTE
Other:     Specific Instructions for next treatment:       Treatment Charges: Mins Units   []  Modalities     [x]  Ther Exercise 44 3   []  Manual Therapy     []  Ther Activities     []  Aquatics     []  Vasocompression      []  Other     Total Treatment time 44 3       Assessment: [x] Progressing toward goals. Added in thoracic mobility to help decrease tight mm and increase ROM and mobility . Continued with strengthening and min tactile cueing for corrective posturing and mechanics. No increased symptoms at end and pt compliant with not waiting 2 weeks for return and allowing session next week. [] No change. [] Other:  [] Patient would continue to benefit from skilled physical therapy services in order to:      STG: (to be met in 8 treatments)  ? Pain:<5/10 at the worst  ? ROM: with L shoulder abd to >150 deg   ? Strength: at least 4+/5 with all shoulder movemnts to improve L UE function  ? Function:UEFS >50/80  Patient to be independent with home exercise program as demonstrated by performance with correct form without cues. Demonstrate Knowledge of fall prevention  LTG: (to be met in 16 treatments)  UEFS score >60/80  Pain <3/10 at the worst with movement  Maximize L UE isometric strength values with hand held dynomometer           Patient goals:'regain more function\"  Pt. Education:  [x] Yes  [] No  [x] Reviewed Prior HEP/Ed  Method of Education: [] Verbal  [] Demo  [x] Written  Access Code: [email or web address]  URL: [email or web address]. com/  Date: 07/13/2022  Prepared by: Nabil Saxena    Exercises  Sidelying Shoulder External Rotation - 2 x daily - 7 x weekly - 2 sets - 10 reps  Sidelying Shoulder Abduction Palm Forward - 2 x daily - 7 x weekly - 2 sets - 10 reps  Prone Shoulder Extension - Single Arm - 2 x daily - 7 x weekly - 2 sets - 10 reps  Prone Single Arm Shoulder Horizontal Abduction with Dumbbell - 2 x daily - 7 x weekly - 2 sets - 10 reps  Prone Shoulder Flexion with Dumbbell - 2 x daily - 7 x weekly - 2 sets - 10 reps  Prone Shoulder External Rotation - 2 x daily - 7 x weekly - 2 sets - 10 reps  Supine Shoulder Alphabet - 2 x daily - 7 x weekly - 2 sets - 1 reps  Shoulder External Rotation with Anchored Resistance - 2 x daily - 7 x weekly - 2 sets - 10 reps  Standing Shoulder Internal Rotation with Anchored Resistance - 2 x daily - 7 x weekly - 2 sets - 10 reps  Push Up on Table - 2 x daily - 7 x weekly - 2 sets - 10 reps      Comprehension of Education:  [] Verbalizes understanding. [] Demonstrates understanding. [x] Needs review. [] Demonstrates/verbalizes HEP/Ed previously given. Plan: [x] Continue current frequency toward long and short term goals.     [] Specific Instructions for subsequent treatments:       Time In: 11:05 am            Time Out: 11:51 am    Electronically signed by:  Ada Gaitan PTA

## 2022-08-31 ENCOUNTER — HOSPITAL ENCOUNTER (OUTPATIENT)
Dept: PHYSICAL THERAPY | Facility: CLINIC | Age: 47
Setting detail: THERAPIES SERIES
Discharge: HOME | End: 2022-08-31
Payer: MEDICAID

## 2022-08-31 PROCEDURE — 97110 THERAPEUTIC EXERCISES: CPT

## 2022-08-31 NOTE — FLOWSHEET NOTE
[x] [facility]  Outpatient Rehabilitation &  Therapy  [address]  P: [phone]  F: [phone]     Physical Therapy Daily Treatment Note    Date:  2022  Patient Name:  Tasneem Parker     :  1975  MRN: [id number]  Physician: Dr. Carol Ellsworth: [address] [facility] yr; [address]; No patient liability  Medical Diagnosis: Midline thoracic spine pain    Rehab Codes: M54.6  Onset Date: 20                             Next 's appt.: 22  Visit# / total visits:      Cancels/No Shows: 1/0    Subjective:  Denies pain this afternoon but states that with the rain the past few days stiffness throughout the LUE, elbow and shoulder, has been more. Less stiffness today as past few days but present.    Pain:  [] Yes  [x] No Location: L shoulder and mid T spine   Pain Rating: (0-10 scale) 0/10  Pain altered Tx:  [x] No  [] Yes  Action:  Comments:       Objective:  Modalities:   Treatment Location    Left      Right                          Position   Shoulder [x]          []  [] Supine    [] Prone   [] Side lying  [x] Sitting          Treatment Modality   PRN Vasocompression    34° temp    Med pressure     15min   1 Other:  ex       Precautions: standard  Exercises:  Exercise Reps/ Time Weight/ Level Completed Comments   Airdyne 10m  x 80% legs, 20% UE          Sidelying           Ext rot 20 2 lbs xx W/ towel; alt with abd   Abd 20 2 lbs xx Alt w/ ER   Prone       perform prone ex as a circuit   Shoulder ext 15 2 lb xx  supinated forearm   Shoulder hor abd 15 2 lb xx  neutral forearm   Shoulder scaption 15 2 lb xx     Shoulder ER at 90/90 15  xx             Gym          TB IR/ER  2x10 lime xx W/ towel, issued lime band   ABCs on wall 3 ways 1x ea 3.5 lbs xx 90 and 120 deg of shoulder flexion  90 and 120 of shoulder abd   Re-bounder push ups  2x10   xx             Thoracic mobility       Modified warrior 10x  xx Bilaterally Rainbows    xx    Quadruped thread needle 10x ea  x    Supine ext 3'  x Foam roller   Other:     Specific Instructions for next treatment:       Treatment Charges: Mins Units   []  Modalities     [x]  Ther Exercise 43 3   []  Manual Therapy     []  Ther Activities     []  Aquatics     []  Vasocompression      []  Other     Total Treatment time 43 3       Assessment: [x] Progressing toward goals. Continued with strengthening ex to progress shoulder stability and function. Demonstrates good recall of previous ex. Does fatigue quickly with ER. Denies pain but mentions increased stiffness when maintaining arm in one position for extended period of time. Added quadruped thread the needle movement and supine foam roller ext to improve thoracic mobility. [] No change. [] Other:  [x] Patient would continue to benefit from skilled physical therapy services in order to:      STG: (to be met in 8 treatments)  ? Pain:<5/10 at the worst  ? ROM: with L shoulder abd to >150 deg   ? Strength: at least 4+/5 with all shoulder movemnts to improve L UE function  ? Function:UEFS >50/80  Patient to be independent with home exercise program as demonstrated by performance with correct form without cues. Demonstrate Knowledge of fall prevention  LTG: (to be met in 16 treatments)  UEFS score >60/80  Pain <3/10 at the worst with movement  Maximize L UE isometric strength values with hand held dynomometer           Patient goals:'regain more function\"  Pt. Education:  [x] Yes  [] No  [x] Reviewed Prior HEP/Ed  Method of Education: [] Verbal  [] Demo  [x] Written  Access Code: [email or web address]  URL: [employer]. com/  Date: 07/13/2022  Prepared by: Satinder Benson    Exercises  Sidelying Shoulder External Rotation - 2 x daily - 7 x weekly - 2 sets - 10 reps  Sidelying Shoulder Abduction Palm Forward - 2 x daily - 7 x weekly - 2 sets - 10 reps  Prone Shoulder Extension - Single Arm - 2 x daily - 7 x weekly - 2 sets - 10 reps  Prone Single Arm Shoulder Horizontal Abduction with Dumbbell - 2 x daily - 7 x weekly - 2 sets - 10 reps  Prone Shoulder Flexion with Dumbbell - 2 x daily - 7 x weekly - 2 sets - 10 reps  Prone Shoulder External Rotation - 2 x daily - 7 x weekly - 2 sets - 10 reps  Supine Shoulder Alphabet - 2 x daily - 7 x weekly - 2 sets - 1 reps  Shoulder External Rotation with Anchored Resistance - 2 x daily - 7 x weekly - 2 sets - 10 reps  Standing Shoulder Internal Rotation with Anchored Resistance - 2 x daily - 7 x weekly - 2 sets - 10 reps  Push Up on Table - 2 x daily - 7 x weekly - 2 sets - 10 reps      Comprehension of Education:  [] Verbalizes understanding. [] Demonstrates understanding. [x] Needs review. [] Demonstrates/verbalizes HEP/Ed previously given. Plan: [x] Continue current frequency toward long and short term goals.     [] Specific Instructions for subsequent treatments:       Time In: 12:03 pm            Time Out: 12:51 pm    Electronically signed by:  Olivia Walls PTA

## 2022-12-19 ENCOUNTER — OFFICE VISIT (OUTPATIENT)
Dept: PRIMARY CARE CLINIC | Age: 47
End: 2022-12-19
Payer: MEDICAID

## 2022-12-19 VITALS
OXYGEN SATURATION: 98 % | WEIGHT: 163 LBS | SYSTOLIC BLOOD PRESSURE: 128 MMHG | RESPIRATION RATE: 16 BRPM | HEART RATE: 76 BPM | BODY MASS INDEX: 24.78 KG/M2 | DIASTOLIC BLOOD PRESSURE: 84 MMHG

## 2022-12-19 DIAGNOSIS — E55.9 VITAMIN D DEFICIENCY: ICD-10-CM

## 2022-12-19 DIAGNOSIS — E78.2 MIXED HYPERLIPIDEMIA: Primary | ICD-10-CM

## 2022-12-19 DIAGNOSIS — M54.6 MIDLINE THORACIC BACK PAIN, UNSPECIFIED CHRONICITY: ICD-10-CM

## 2022-12-19 PROCEDURE — G8420 CALC BMI NORM PARAMETERS: HCPCS | Performed by: STUDENT IN AN ORGANIZED HEALTH CARE EDUCATION/TRAINING PROGRAM

## 2022-12-19 PROCEDURE — G8427 DOCREV CUR MEDS BY ELIG CLIN: HCPCS | Performed by: STUDENT IN AN ORGANIZED HEALTH CARE EDUCATION/TRAINING PROGRAM

## 2022-12-19 PROCEDURE — 1036F TOBACCO NON-USER: CPT | Performed by: STUDENT IN AN ORGANIZED HEALTH CARE EDUCATION/TRAINING PROGRAM

## 2022-12-19 PROCEDURE — 99213 OFFICE O/P EST LOW 20 MIN: CPT | Performed by: STUDENT IN AN ORGANIZED HEALTH CARE EDUCATION/TRAINING PROGRAM

## 2022-12-19 PROCEDURE — G8484 FLU IMMUNIZE NO ADMIN: HCPCS | Performed by: STUDENT IN AN ORGANIZED HEALTH CARE EDUCATION/TRAINING PROGRAM

## 2022-12-19 RX ORDER — ATORVASTATIN CALCIUM 40 MG/1
40 TABLET, FILM COATED ORAL DAILY
Qty: 30 TABLET | Refills: 3 | Status: SHIPPED | OUTPATIENT
Start: 2022-12-19

## 2022-12-19 RX ORDER — ERGOCALCIFEROL 1.25 MG/1
50000 CAPSULE ORAL WEEKLY
Qty: 12 CAPSULE | Refills: 1 | Status: SHIPPED | OUTPATIENT
Start: 2022-12-19

## 2022-12-19 ASSESSMENT — ENCOUNTER SYMPTOMS
EYE ITCHING: 0
BACK PAIN: 1
SHORTNESS OF BREATH: 0
EYE DISCHARGE: 0
ABDOMINAL DISTENTION: 0
ABDOMINAL PAIN: 0
WHEEZING: 0
RHINORRHEA: 0
COUGH: 0

## 2022-12-19 ASSESSMENT — PATIENT HEALTH QUESTIONNAIRE - PHQ9
SUM OF ALL RESPONSES TO PHQ9 QUESTIONS 1 & 2: 0
1. LITTLE INTEREST OR PLEASURE IN DOING THINGS: 0
SUM OF ALL RESPONSES TO PHQ QUESTIONS 1-9: 0
2. FEELING DOWN, DEPRESSED OR HOPELESS: 0
SUM OF ALL RESPONSES TO PHQ QUESTIONS 1-9: 0

## 2022-12-19 NOTE — PROGRESS NOTES
[address] [facility]  [facility] [address] [facility] [address]  [address] [address]  Dept: [phone]  Dept Fax: [phone]    Geraldine Matthews is a 41 y.o. male who presents today for his medical conditions/complaints as noted below. Chief Complaint   Patient presents with    6 Month Follow-Up    Back Pain     Thoracic       HPI:     41 y. o. M presented to office as regular follow-up. He denied any current concerns. Mentioned that he has been doing major lifestyle changes and living healthy lifestyle now. Continues to have intermittent episodes of upper thoracic back pain. Requested to follow chiropractor which helped him in the past.. Has been checking his blood pressure at home which remains normal.  Vital signs normal.  Advised lifestyle changes. Medications reviewed and refilled as appropriate, problem list updated. All concerns discussed in details and all questions answered to patient satisfaction.             Hemoglobin A1C (%)   Date Value   2022 5.4             ( goal A1C is < 7)   No results found for: LABMICR  LDL Cholesterol (mg/dL)   Date Value   2022 202 (H)       (goal LDL is <100)   AST (U/L)   Date Value   2022 19     ALT (U/L)   Date Value   2022 21     BUN (mg/dL)   Date Value   2022 18     BP Readings from Last 3 Encounters:   22 128/84   22 131/84   06/15/22 124/76          (goal 120/80)    Past Medical History:   Diagnosis Date    Gout       Past Surgical History:   Procedure Laterality Date    SHOULDER SURGERY      VASECTOMY         Family History   Family history unknown: Yes       Social History     Tobacco Use    Smoking status: Former     Packs/day: 1.00     Types: Cigarettes     Quit date: 6/15/2019     Years since quitting: 3.5    Smokeless tobacco: Never   Substance Use Topics    Alcohol use: No      Current Outpatient Medications   Medication Sig Dispense Refill    atorvastatin (LIPITOR) 40 MG tablet Take 1 tablet by mouth daily 30 tablet 3    vitamin D (ERGOCALCIFEROL) 1.25 MG (41751 UT) CAPS capsule Take 1 capsule by mouth once a week 12 capsule 1    benzoyl peroxide 5 % external liquid Wash affected areas once daily 227 g 3    fexofenadine (ALLEGRA) 180 MG tablet Take 1-2 tablets daily, prn itching/allergies. 60 tablet 1    Blood Pressure KIT 1 kit by Does not apply route daily 1 kit 0     No current facility-administered medications for this visit. Allergies   Allergen Reactions    Mixed Grasses Hives, Itching, Swelling and Rash     Unspecified type, req. By patient     Tramadol      Other reaction(s): GI Upset, lightheaded, chills  Passed out      Ultram [Tramadol Hcl] Nausea Only     Dizzy and lightheaded       Health Maintenance   Topic Date Due    COVID-19 Vaccine (1) Never done    Hepatitis C screen  Never done    Flu vaccine (1) Never done    Colorectal Cancer Screen  Never done    Depression Screen  06/15/2023    A1C test (Diabetic or Prediabetic)  08/11/2023    Lipids  08/11/2023    DTaP/Tdap/Td vaccine (2 - Td or Tdap) 07/18/2030    HIV screen  Completed    Hepatitis A vaccine  Aged Out    Hib vaccine  Aged Out    Meningococcal (ACWY) vaccine  Aged Out    Pneumococcal 0-64 years Vaccine  Aged Out       Subjective:      Review of Systems   Constitutional:  Negative for chills, fatigue and fever. HENT:  Negative for congestion, hearing loss and rhinorrhea. Eyes:  Negative for discharge and itching. Respiratory:  Negative for cough, shortness of breath and wheezing. Cardiovascular:  Negative for chest pain, palpitations and leg swelling. Gastrointestinal:  Negative for abdominal distention and abdominal pain. Endocrine: Negative for polydipsia and polyphagia. Genitourinary:  Negative for difficulty urinating and dysuria. Musculoskeletal:  Positive for back pain. Negative for arthralgias. Upper thoracic back pain   Skin:  Negative for rash and wound.    Neurological: Negative for dizziness, seizures, light-headedness and headaches. Psychiatric/Behavioral:  Negative for agitation and behavioral problems. Objective:     Physical Exam  Constitutional:       Appearance: He is well-developed. HENT:      Head: Normocephalic and atraumatic. Eyes:      Conjunctiva/sclera: Conjunctivae normal.      Pupils: Pupils are equal, round, and reactive to light. Neck:      Thyroid: No thyromegaly. Vascular: No JVD. Cardiovascular:      Rate and Rhythm: Normal rate and regular rhythm. Heart sounds: Normal heart sounds. No murmur heard. Pulmonary:      Effort: Pulmonary effort is normal.      Breath sounds: Normal breath sounds. No wheezing. Abdominal:      General: Bowel sounds are normal. There is no distension. Palpations: Abdomen is soft. Tenderness: There is no abdominal tenderness. There is no rebound. Musculoskeletal:         General: No tenderness. Normal range of motion. Cervical back: Normal range of motion and neck supple. Neurological:      Mental Status: He is alert and oriented to person, place, and time. Cranial Nerves: No cranial nerve deficit. Psychiatric:         Behavior: Behavior normal.     /84   Pulse 76   Resp 16   Wt 163 lb (73.9 kg)   SpO2 98%   BMI 24.78 kg/m²     Assessment:        Diagnoses and all orders for this visit:  Mixed hyperlipidemia  -     atorvastatin (LIPITOR) 40 MG tablet; Take 1 tablet by mouth daily  Vitamin D deficiency  -     vitamin D (ERGOCALCIFEROL) 1.25 MG (94386 UT) CAPS capsule; Take 1 capsule by mouth once a week  Midline thoracic back pain, unspecified chronicity  Delpha Dach to follow-up with chiropractor. Declined physical therapy. Plan:      Return in about 6 months (around 6/19/2023) for Reg f/u . No orders of the defined types were placed in this encounter.     Orders Placed This Encounter   Medications    atorvastatin (LIPITOR) 40 MG tablet     Sig: Take 1 tablet by mouth daily     Dispense:  30 tablet     Refill:  3    vitamin D (ERGOCALCIFEROL) 1.25 MG (03815 UT) CAPS capsule     Sig: Take 1 capsule by mouth once a week     Dispense:  12 capsule     Refill:  1         Patient given educational materials - see patient instructions. Discussed use, benefit, and side effects of prescribedmedications. All patient questions answered. Pt voiced understanding. Reviewed health maintenance. Instructed to continue current medications, diet and exercise. Patient agreed with treatment plan. Follow up as directed. Electronically signed by   Refugio Smith MD on 12/19/2022 at 12:00 PM  [facility]. Please note that this chart was generated using voice recognition Dragon dictation software. Although every effort was made to ensure the accuracy of this automatedtranscription, some errors in transcription may have occurred.

## 2023-04-05 DIAGNOSIS — E78.2 MIXED HYPERLIPIDEMIA: ICD-10-CM

## 2023-04-05 RX ORDER — ATORVASTATIN CALCIUM 40 MG/1
40 TABLET, FILM COATED ORAL DAILY
Qty: 90 TABLET | Refills: 1 | Status: SHIPPED | OUTPATIENT
Start: 2023-04-05

## 2023-04-05 NOTE — TELEPHONE ENCOUNTER
----- Message from April Caitlyn sent at 4/5/2023 12:49 PM EDT -----  Subject: Refill Request    QUESTIONS  Name of Medication? atorvastatin (LIPITOR) 40 MG tablet  Patient-reported dosage and instructions? Take 1 tablet by mouth daily  How many days do you have left? 20  Preferred Pharmacy? [facility] [id number]  Pharmacy phone number (if available)? [phone]  ---------------------------------------------------------------------------  --------------  CALL BACK INFO  What is the best way for the office to contact you? OK to leave message on   voicemail  Preferred Call Back Phone Number? [phone]  ---------------------------------------------------------------------------  --------------  SCRIPT ANSWERS  Relationship to Patient?  Self

## 2023-05-03 ENCOUNTER — OFFICE VISIT (OUTPATIENT)
Dept: PRIMARY CARE CLINIC | Age: 48
End: 2023-05-03
Payer: MEDICAID

## 2023-05-03 VITALS
RESPIRATION RATE: 16 BRPM | HEART RATE: 93 BPM | SYSTOLIC BLOOD PRESSURE: 128 MMHG | OXYGEN SATURATION: 100 % | HEIGHT: 68 IN | DIASTOLIC BLOOD PRESSURE: 66 MMHG | BODY MASS INDEX: 23.13 KG/M2 | WEIGHT: 152.6 LBS

## 2023-05-03 DIAGNOSIS — Z12.11 COLON CANCER SCREENING: ICD-10-CM

## 2023-05-03 DIAGNOSIS — Z76.89 ENCOUNTER TO ESTABLISH CARE: Primary | ICD-10-CM

## 2023-05-03 DIAGNOSIS — E78.9 LIPID DISORDER: ICD-10-CM

## 2023-05-03 DIAGNOSIS — M54.6 MIDLINE THORACIC BACK PAIN, UNSPECIFIED CHRONICITY: ICD-10-CM

## 2023-05-03 PROCEDURE — G8427 DOCREV CUR MEDS BY ELIG CLIN: HCPCS | Performed by: NURSE PRACTITIONER

## 2023-05-03 PROCEDURE — G8420 CALC BMI NORM PARAMETERS: HCPCS | Performed by: NURSE PRACTITIONER

## 2023-05-03 PROCEDURE — 99204 OFFICE O/P NEW MOD 45 MIN: CPT | Performed by: NURSE PRACTITIONER

## 2023-05-03 PROCEDURE — 1036F TOBACCO NON-USER: CPT | Performed by: NURSE PRACTITIONER

## 2023-05-03 SDOH — ECONOMIC STABILITY: FOOD INSECURITY: WITHIN THE PAST 12 MONTHS, THE FOOD YOU BOUGHT JUST DIDN'T LAST AND YOU DIDN'T HAVE MONEY TO GET MORE.: NEVER TRUE

## 2023-05-03 SDOH — ECONOMIC STABILITY: FOOD INSECURITY: WITHIN THE PAST 12 MONTHS, YOU WORRIED THAT YOUR FOOD WOULD RUN OUT BEFORE YOU GOT MONEY TO BUY MORE.: NEVER TRUE

## 2023-05-03 SDOH — ECONOMIC STABILITY: INCOME INSECURITY: HOW HARD IS IT FOR YOU TO PAY FOR THE VERY BASICS LIKE FOOD, HOUSING, MEDICAL CARE, AND HEATING?: NOT HARD AT ALL

## 2023-05-03 SDOH — ECONOMIC STABILITY: HOUSING INSECURITY
IN THE LAST 12 MONTHS, WAS THERE A TIME WHEN YOU DID NOT HAVE A STEADY PLACE TO SLEEP OR SLEPT IN A SHELTER (INCLUDING NOW)?: NO

## 2023-05-03 ASSESSMENT — PATIENT HEALTH QUESTIONNAIRE - PHQ9
SUM OF ALL RESPONSES TO PHQ QUESTIONS 1-9: 0
2. FEELING DOWN, DEPRESSED OR HOPELESS: 0
SUM OF ALL RESPONSES TO PHQ9 QUESTIONS 1 & 2: 0
SUM OF ALL RESPONSES TO PHQ QUESTIONS 1-9: 0
SUM OF ALL RESPONSES TO PHQ QUESTIONS 1-9: 0
1. LITTLE INTEREST OR PLEASURE IN DOING THINGS: 0
SUM OF ALL RESPONSES TO PHQ QUESTIONS 1-9: 0

## 2023-05-03 ASSESSMENT — ENCOUNTER SYMPTOMS
SINUS PRESSURE: 0
CHEST TIGHTNESS: 0
SINUS PAIN: 0
COUGH: 0
TROUBLE SWALLOWING: 0
NAUSEA: 0
EYE DISCHARGE: 0
DIARRHEA: 0
EYE REDNESS: 0
ABDOMINAL PAIN: 0
SHORTNESS OF BREATH: 0
VOMITING: 0
SORE THROAT: 0
EYE ITCHING: 0
WHEEZING: 0
BACK PAIN: 1

## 2023-05-03 NOTE — PROGRESS NOTES
Mouth: Mucous membranes are moist.      Pharynx: Oropharynx is clear. No oropharyngeal exudate or posterior oropharyngeal erythema. Eyes:      Extraocular Movements: Extraocular movements intact. Conjunctiva/sclera: Conjunctivae normal.      Pupils: Pupils are equal, round, and reactive to light. Cardiovascular:      Rate and Rhythm: Normal rate and regular rhythm. Pulses: Normal pulses. Heart sounds: Normal heart sounds. No murmur heard. Pulmonary:      Effort: Pulmonary effort is normal. No respiratory distress. Breath sounds: Normal breath sounds. No wheezing. Abdominal:      General: Abdomen is flat. Bowel sounds are normal. There is no distension. Palpations: Abdomen is soft. Tenderness: There is no abdominal tenderness. There is no guarding. Musculoskeletal:         General: Normal range of motion. Cervical back: Normal range of motion and neck supple. Skin:     General: Skin is warm and dry. Capillary Refill: Capillary refill takes less than 2 seconds. Neurological:      General: No focal deficit present. Mental Status: He is alert and oriented to person, place, and time. Motor: No weakness. Coordination: Coordination normal.      Gait: Gait normal.   Psychiatric:         Mood and Affect: Mood normal.         Behavior: Behavior normal.         Thought Content: Thought content normal.     /66   Pulse 93   Resp 16   Ht 5' 7.5\" (1.715 m)   Wt 152 lb 9.6 oz (69.2 kg)   SpO2 100%   BMI 23.55 kg/m²     Assessment:       Diagnosis Orders   1. Encounter to establish care        2. Colon cancer screening  [facility] Screening Colonoscopy      3. Lipid disorder  Lipid Panel      4. Midline thoracic back pain, unspecified chronicity            :      Return in about 6 months (around 11/3/2023) for cholesterol follow up. 1.  Encounter establish care  Review previous records and lab work  2 colon cancer screening  Referral for colonoscopy  3.

## 2023-05-04 ENCOUNTER — TELEPHONE (OUTPATIENT)
Dept: SURGERY | Age: 48
End: 2023-05-04

## 2023-05-04 NOTE — TELEPHONE ENCOUNTER
5/4/23- [facility] (1st attempt) to schedule colonoscopy. VM is full and cannot accept new messages.

## 2023-08-07 DIAGNOSIS — E55.9 VITAMIN D DEFICIENCY: ICD-10-CM

## 2023-08-07 RX ORDER — ERGOCALCIFEROL 1.25 MG/1
50000 CAPSULE ORAL WEEKLY
Qty: 12 CAPSULE | Refills: 1 | Status: SHIPPED | OUTPATIENT
Start: 2023-08-07

## 2023-10-11 ENCOUNTER — HOSPITAL ENCOUNTER (OUTPATIENT)
Age: 48
Discharge: HOME | End: 2023-10-11
Payer: MEDICAID

## 2023-10-11 DIAGNOSIS — E78.9 LIPID DISORDER: ICD-10-CM

## 2023-10-11 LAB
CHOLEST SERPL-MCNC: 151 MG/DL (ref 0–199)
CHOLESTEROL/HDL RATIO: 3
HDLC SERPL-MCNC: 53 MG/DL
LDLC SERPL CALC-MCNC: 85 MG/DL (ref 0–100)
TRIGL SERPL-MCNC: 64 MG/DL (ref 0–149)
VLDLC SERPL CALC-MCNC: 13 MG/DL

## 2023-10-11 PROCEDURE — 80061 LIPID PANEL: CPT

## 2023-10-11 PROCEDURE — 36415 COLL VENOUS BLD VENIPUNCTURE: CPT

## 2023-10-25 DIAGNOSIS — E55.9 VITAMIN D DEFICIENCY: ICD-10-CM

## 2023-10-25 DIAGNOSIS — E78.2 MIXED HYPERLIPIDEMIA: ICD-10-CM

## 2023-10-25 RX ORDER — ATORVASTATIN CALCIUM 40 MG/1
40 TABLET, FILM COATED ORAL DAILY
Qty: 90 TABLET | Refills: 1 | Status: SHIPPED | OUTPATIENT
Start: 2023-10-25

## 2023-10-25 RX ORDER — ERGOCALCIFEROL 1.25 MG/1
50000 CAPSULE ORAL WEEKLY
Qty: 12 CAPSULE | Refills: 1 | Status: SHIPPED | OUTPATIENT
Start: 2023-10-25

## 2023-11-06 ENCOUNTER — OFFICE VISIT (OUTPATIENT)
Dept: PRIMARY CARE CLINIC | Age: 48
End: 2023-11-06
Payer: MEDICAID

## 2023-11-06 VITALS
SYSTOLIC BLOOD PRESSURE: 120 MMHG | DIASTOLIC BLOOD PRESSURE: 68 MMHG | RESPIRATION RATE: 18 BRPM | WEIGHT: 170.6 LBS | BODY MASS INDEX: 26.33 KG/M2 | HEART RATE: 99 BPM | OXYGEN SATURATION: 99 %

## 2023-11-06 DIAGNOSIS — M54.6 MIDLINE THORACIC BACK PAIN, UNSPECIFIED CHRONICITY: ICD-10-CM

## 2023-11-06 DIAGNOSIS — E78.9 LIPID DISORDER: Primary | ICD-10-CM

## 2023-11-06 DIAGNOSIS — Z12.11 COLON CANCER SCREENING: ICD-10-CM

## 2023-11-06 PROCEDURE — G8419 CALC BMI OUT NRM PARAM NOF/U: HCPCS | Performed by: NURSE PRACTITIONER

## 2023-11-06 PROCEDURE — G8427 DOCREV CUR MEDS BY ELIG CLIN: HCPCS | Performed by: NURSE PRACTITIONER

## 2023-11-06 PROCEDURE — 1036F TOBACCO NON-USER: CPT | Performed by: NURSE PRACTITIONER

## 2023-11-06 PROCEDURE — G8484 FLU IMMUNIZE NO ADMIN: HCPCS | Performed by: NURSE PRACTITIONER

## 2023-11-06 PROCEDURE — 99214 OFFICE O/P EST MOD 30 MIN: CPT | Performed by: NURSE PRACTITIONER

## 2023-11-06 RX ORDER — ATORVASTATIN CALCIUM 20 MG/1
20 TABLET, FILM COATED ORAL DAILY
Qty: 90 TABLET | Refills: 1 | Status: SHIPPED | OUTPATIENT
Start: 2023-11-06

## 2023-11-06 ASSESSMENT — ENCOUNTER SYMPTOMS
DIARRHEA: 0
VOMITING: 0
SHORTNESS OF BREATH: 0
SINUS PAIN: 0
WHEEZING: 0
EYE DISCHARGE: 0
EYE ITCHING: 0
COUGH: 0
SINUS PRESSURE: 0
CHEST TIGHTNESS: 0
TROUBLE SWALLOWING: 0
ABDOMINAL PAIN: 0
SORE THROAT: 0
EYE REDNESS: 0
NAUSEA: 0

## 2023-11-06 ASSESSMENT — PATIENT HEALTH QUESTIONNAIRE - PHQ9
SUM OF ALL RESPONSES TO PHQ QUESTIONS 1-9: 0
SUM OF ALL RESPONSES TO PHQ9 QUESTIONS 1 & 2: 0
1. LITTLE INTEREST OR PLEASURE IN DOING THINGS: 0
SUM OF ALL RESPONSES TO PHQ QUESTIONS 1-9: 0
2. FEELING DOWN, DEPRESSED OR HOPELESS: 0

## 2023-11-06 NOTE — PROGRESS NOTES
[id number]  PRIMARY CARE  [address] [address]  Dept: [phone]  Dept Fax: [phone]    Latisha Hernandez is a 57 y.o. male who presents today for his medical conditions/complaintsas noted below. Latisha Hernandez is c/o of Cholesterol Problem (6 mo f/u, discuss lowering statin dose) and Loss of Consciousness (Had a syncope episode x1 month ago, possibly related to low BS level, didn't eat much that day, felt better after eating, did hit head on fall down stairs, denies N/V or vision changes)        HPI:     Patient presents for follow-up  Blood pressure stable  Weight is up 18 pounds since last visit    Patient presents for follow-up. He has changed his diet. He has cut back and changed a lot of foods. Mild exercise    He did pass out a few weeks ago. He didn't eat that day and then exercised. He did fall down some stairs and hit his head. He felt off for a few days. He believes that this was related to low blood sugar since he did not eat most of the day        Past Medical History:   Diagnosis Date    Gout       Past Surgical History:   Procedure Laterality Date    SHOULDER SURGERY      VASECTOMY         Family History   Family history unknown: Yes       Social History     Tobacco Use    Smoking status: Former     Packs/day: 1     Types: Cigarettes     Quit date: 6/15/2019     Years since quittin.3    Smokeless tobacco: Never   Substance Use Topics    Alcohol use: No      Current Outpatient Medications   Medication Sig Dispense Refill    atorvastatin (LIPITOR) 20 MG tablet Take 1 tablet by mouth daily 90 tablet 1    vitamin D (ERGOCALCIFEROL) 1.25 MG (66306 UT) CAPS capsule Take 1 capsule by mouth once a week 12 capsule 1    Blood Pressure KIT 1 kit by Does not apply route daily 1 kit 0     No current facility-administered medications for this visit.      Allergies   Allergen Reactions    Mixed Grasses Hives, Itching, Swelling and